# Patient Record
Sex: MALE | Race: WHITE | Employment: PART TIME | URBAN - METROPOLITAN AREA
[De-identification: names, ages, dates, MRNs, and addresses within clinical notes are randomized per-mention and may not be internally consistent; named-entity substitution may affect disease eponyms.]

---

## 2019-06-26 ENCOUNTER — OFFICE VISIT (OUTPATIENT)
Dept: FAMILY MEDICINE CLINIC | Facility: CLINIC | Age: 19
End: 2019-06-26
Payer: COMMERCIAL

## 2019-06-26 VITALS
HEART RATE: 80 BPM | TEMPERATURE: 98 F | RESPIRATION RATE: 16 BRPM | HEIGHT: 67 IN | DIASTOLIC BLOOD PRESSURE: 80 MMHG | WEIGHT: 181 LBS | SYSTOLIC BLOOD PRESSURE: 120 MMHG | BODY MASS INDEX: 28.41 KG/M2

## 2019-06-26 DIAGNOSIS — F51.01 PRIMARY INSOMNIA: ICD-10-CM

## 2019-06-26 DIAGNOSIS — R53.82 CHRONIC FATIGUE: Primary | ICD-10-CM

## 2019-06-26 PROCEDURE — 99203 OFFICE O/P NEW LOW 30 MIN: CPT | Performed by: FAMILY MEDICINE

## 2019-06-26 NOTE — PATIENT INSTRUCTIONS
Thank you for choosing [facility] at [place] [address]  To Do: Gavin Red  1. Please take meds as directed. [facility] is located in [address]. Monday, Tuesday & Friday – 8 a.m. to 4 p.m.   Wednesday, Thursday – 7 a.m. to outweigh those potential risks and we strive to make you healthier and to improve your quality of life.     Referrals, and Radiology Information:    If your insurance requires a referral to a specialist, please allow 5 business days to process your referral be stressed about problems at work, money worries, or family events. Talk to your healthcare provider  Describe your sleeping problems to your healthcare provider. Try to keep a daily sleep diary for a couple of weeks.  Write down the time you go to bed, t

## 2019-06-26 NOTE — H&P
[address] [place] [address], [address] [place] [address]    History and Physical    Jeanie Cortez Patient Status:  No patient class for patient encounter    2000 MRN [id number]   Location [address] [place] [address], [address] [place] [address] , [address] [place] [address] Attending No medication lists documented. History   History reviewed. No pertinent past medical history.   Past Surgical History:   Procedure Laterality Date   • HERNIA SURGERY       Family History   Problem Relation Age of Onset   • Hypertension Father    • Othe present. Pulmonary/Chest: Effort normal and breath sounds normal. No respiratory distress. He has no wheezes. He has no rales. Abdominal: Soft. Bowel sounds are normal. He exhibits no distension and no mass. There is no tenderness.    Musculoskeletal: No

## 2019-06-27 ENCOUNTER — LAB ENCOUNTER (OUTPATIENT)
Dept: LAB | Age: 19
End: 2019-06-27
Attending: FAMILY MEDICINE
Payer: COMMERCIAL

## 2019-06-27 DIAGNOSIS — R53.82 CHRONIC FATIGUE: ICD-10-CM

## 2019-06-27 PROCEDURE — 80061 LIPID PANEL: CPT | Performed by: FAMILY MEDICINE

## 2019-06-27 PROCEDURE — 36415 COLL VENOUS BLD VENIPUNCTURE: CPT | Performed by: FAMILY MEDICINE

## 2019-06-27 PROCEDURE — 80050 GENERAL HEALTH PANEL: CPT | Performed by: FAMILY MEDICINE

## 2019-06-27 PROCEDURE — 82607 VITAMIN B-12: CPT | Performed by: FAMILY MEDICINE

## 2019-06-27 PROCEDURE — 82306 VITAMIN D 25 HYDROXY: CPT | Performed by: FAMILY MEDICINE

## 2019-07-23 ENCOUNTER — OFFICE VISIT (OUTPATIENT)
Dept: SLEEP CENTER | Age: 19
End: 2019-07-23
Attending: FAMILY MEDICINE
Payer: COMMERCIAL

## 2019-07-23 DIAGNOSIS — R53.82 CHRONIC FATIGUE: ICD-10-CM

## 2019-07-23 PROCEDURE — 95810 POLYSOM 6/> YRS 4/> PARAM: CPT

## 2019-07-30 NOTE — PROCEDURES
[address]       Accredited by the [facility] ([facility])    PATIENT'S NAME:        Darcy Kaba  ATTENDING PHYSICIAN:   Bruce Lopez M.D. REFERRING PHYSICIAN:   Nini Noland.  Lisa Townsend MD normal throughout. There was no significant snoring noted. PERIODIC LIMB MOVEMENTS:  PLM index was 0. EKG:  Normal sinus rhythm throughout. EEG:  Based on the limited recording montage, there were no significant EEG abnormalities noted.     [place]

## 2019-08-13 ENCOUNTER — TELEPHONE (OUTPATIENT)
Dept: FAMILY MEDICINE CLINIC | Facility: CLINIC | Age: 19
End: 2019-08-13

## 2019-08-21 PROBLEM — F51.01 PRIMARY INSOMNIA: Status: ACTIVE | Noted: 2019-08-21

## 2019-08-21 NOTE — PROGRESS NOTES
HPI:    Patient ID: Jimmie Romano is a 20year old male. HPI  Mr. Anneliese Box is a pleasant 25-year-old male follow-up for sleep disturbance. I had ordered some labs for him which were normal.  I reviewed this with them.   Sleep study was done wh is alert. Psychiatric: He has a normal mood and affect. His behavior is normal. Thought content normal.   Vitals reviewed.              ASSESSMENT/PLAN:   Primary insomnia  (primary encounter diagnosis)  -Educated on proper sleep hygiene; avoid alcohol in

## 2020-10-27 ENCOUNTER — TELEPHONE (OUTPATIENT)
Dept: FAMILY MEDICINE CLINIC | Facility: CLINIC | Age: 20
End: 2020-10-27

## 2020-10-27 NOTE — TELEPHONE ENCOUNTER
Patient's mother states he [place] in [place] away at school, needs a tetanus shot for school, has called around in the area and nobody will do it. Does he just need an order? Please advise.

## 2020-10-27 NOTE — TELEPHONE ENCOUNTER
Spoke to pt's mom, advised any [facility] or health dept should be able to give pt TDAP without an order. Pt's mom will talk with pt and call back if he needs anything from our office.

## 2021-12-09 ENCOUNTER — TELEPHONE (OUTPATIENT)
Dept: FAMILY MEDICINE CLINIC | Facility: CLINIC | Age: 21
End: 2021-12-09

## 2021-12-09 NOTE — TELEPHONE ENCOUNTER
Spoke to father of patient. Last night started having chills and body aches. Tested positive for covid today. Advised to take tylenol or motrin. Stay hydrated. Go to ER if any SOB or uncontrollable fever. Quarantine for 10 days.    Advised sobeida

## 2021-12-09 NOTE — TELEPHONE ENCOUNTER
Pt's father called to see if  could recommend the next steps for him. His son was just tested positive for covid today, no exposure that he knows of. Symptoms appear to be mild, has chills and is achy.  He is concerned about this, he wants to know if he n